# Patient Record
Sex: MALE | Race: WHITE | Employment: STUDENT | URBAN - METROPOLITAN AREA
[De-identification: names, ages, dates, MRNs, and addresses within clinical notes are randomized per-mention and may not be internally consistent; named-entity substitution may affect disease eponyms.]

---
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
## 2022-01-23 ENCOUNTER — APPOINTMENT (OUTPATIENT)
Dept: CT IMAGING | Age: 22
End: 2022-01-23
Payer: COMMERCIAL

## 2022-01-23 ENCOUNTER — HOSPITAL ENCOUNTER (EMERGENCY)
Age: 22
Discharge: HOME | End: 2022-01-23
Attending: EMERGENCY MEDICINE
Payer: COMMERCIAL

## 2022-01-23 VITALS
RESPIRATION RATE: 18 BRPM | DIASTOLIC BLOOD PRESSURE: 84 MMHG | OXYGEN SATURATION: 99 % | HEIGHT: 70 IN | WEIGHT: 145 LBS | TEMPERATURE: 99.3 F | SYSTOLIC BLOOD PRESSURE: 132 MMHG | HEART RATE: 91 BPM | BODY MASS INDEX: 20.76 KG/M2

## 2022-01-23 DIAGNOSIS — J36 TONSILLAR ABSCESS: Primary | ICD-10-CM

## 2022-01-23 DIAGNOSIS — J03.90 ACUTE TONSILLITIS, UNSPECIFIED ETIOLOGY: ICD-10-CM

## 2022-01-23 LAB
ANION GAP SERPL CALCULATED.3IONS-SCNC: 12 MMOL/L (ref 7–16)
BASOPHILS ABSOLUTE: 0.05 E9/L (ref 0–0.2)
BASOPHILS RELATIVE PERCENT: 0.3 % (ref 0–2)
BUN BLDV-MCNC: 12 MG/DL (ref 6–20)
CALCIUM SERPL-MCNC: 8.9 MG/DL (ref 8.6–10.2)
CHLORIDE BLD-SCNC: 100 MMOL/L (ref 98–107)
CO2: 24 MMOL/L (ref 22–29)
CREAT SERPL-MCNC: 1 MG/DL (ref 0.7–1.2)
EOSINOPHILS ABSOLUTE: 0.01 E9/L (ref 0.05–0.5)
EOSINOPHILS RELATIVE PERCENT: 0.1 % (ref 0–6)
GFR AFRICAN AMERICAN: >60
GFR NON-AFRICAN AMERICAN: >60 ML/MIN/1.73
GLUCOSE BLD-MCNC: 110 MG/DL (ref 74–99)
HCT VFR BLD CALC: 41.1 % (ref 37–54)
HEMOGLOBIN: 14.2 G/DL (ref 12.5–16.5)
IMMATURE GRANULOCYTES #: 0.06 E9/L
IMMATURE GRANULOCYTES %: 0.4 % (ref 0–5)
LYMPHOCYTES ABSOLUTE: 2.63 E9/L (ref 1.5–4)
LYMPHOCYTES RELATIVE PERCENT: 17.2 % (ref 20–42)
MCH RBC QN AUTO: 29.2 PG (ref 26–35)
MCHC RBC AUTO-ENTMCNC: 34.5 % (ref 32–34.5)
MCV RBC AUTO: 84.4 FL (ref 80–99.9)
MONO TEST: POSITIVE
MONOCYTES ABSOLUTE: 0.44 E9/L (ref 0.1–0.95)
MONOCYTES RELATIVE PERCENT: 2.9 % (ref 2–12)
NEUTROPHILS ABSOLUTE: 12.14 E9/L (ref 1.8–7.3)
NEUTROPHILS RELATIVE PERCENT: 79.1 % (ref 43–80)
PDW BLD-RTO: 11.9 FL (ref 11.5–15)
PLATELET # BLD: 202 E9/L (ref 130–450)
PMV BLD AUTO: 9.6 FL (ref 7–12)
POTASSIUM SERPL-SCNC: 4.6 MMOL/L (ref 3.5–5)
RBC # BLD: 4.87 E12/L (ref 3.8–5.8)
SODIUM BLD-SCNC: 136 MMOL/L (ref 132–146)
STREP GRP A PCR: NEGATIVE
WBC # BLD: 15.3 E9/L (ref 4.5–11.5)

## 2022-01-23 PROCEDURE — 70491 CT SOFT TISSUE NECK W/DYE: CPT

## 2022-01-23 PROCEDURE — 80048 BASIC METABOLIC PNL TOTAL CA: CPT

## 2022-01-23 PROCEDURE — 96365 THER/PROPH/DIAG IV INF INIT: CPT

## 2022-01-23 PROCEDURE — 2580000003 HC RX 258: Performed by: EMERGENCY MEDICINE

## 2022-01-23 PROCEDURE — 36415 COLL VENOUS BLD VENIPUNCTURE: CPT

## 2022-01-23 PROCEDURE — 96375 TX/PRO/DX INJ NEW DRUG ADDON: CPT

## 2022-01-23 PROCEDURE — 2580000003 HC RX 258: Performed by: STUDENT IN AN ORGANIZED HEALTH CARE EDUCATION/TRAINING PROGRAM

## 2022-01-23 PROCEDURE — 87880 STREP A ASSAY W/OPTIC: CPT

## 2022-01-23 PROCEDURE — 85025 COMPLETE CBC W/AUTO DIFF WBC: CPT

## 2022-01-23 PROCEDURE — 6360000004 HC RX CONTRAST MEDICATION: Performed by: STUDENT IN AN ORGANIZED HEALTH CARE EDUCATION/TRAINING PROGRAM

## 2022-01-23 PROCEDURE — 99282 EMERGENCY DEPT VISIT SF MDM: CPT

## 2022-01-23 PROCEDURE — 6360000002 HC RX W HCPCS: Performed by: EMERGENCY MEDICINE

## 2022-01-23 PROCEDURE — 86308 HETEROPHILE ANTIBODY SCREEN: CPT

## 2022-01-23 RX ORDER — 0.9 % SODIUM CHLORIDE 0.9 %
1000 INTRAVENOUS SOLUTION INTRAVENOUS ONCE
Status: COMPLETED | OUTPATIENT
Start: 2022-01-23 | End: 2022-01-23

## 2022-01-23 RX ORDER — METHYLPREDNISOLONE 4 MG/1
TABLET ORAL
Qty: 21 TABLET | Refills: 0 | Status: SHIPPED | OUTPATIENT
Start: 2022-01-23 | End: 2022-01-29

## 2022-01-23 RX ORDER — DEXAMETHASONE SODIUM PHOSPHATE 10 MG/ML
10 INJECTION INTRAMUSCULAR; INTRAVENOUS ONCE
Status: COMPLETED | OUTPATIENT
Start: 2022-01-23 | End: 2022-01-23

## 2022-01-23 RX ORDER — AMOXICILLIN AND CLAVULANATE POTASSIUM 875; 125 MG/1; MG/1
1 TABLET, FILM COATED ORAL 2 TIMES DAILY
Qty: 20 TABLET | Refills: 0 | Status: SHIPPED | OUTPATIENT
Start: 2022-01-23 | End: 2022-02-02

## 2022-01-23 RX ORDER — KETOROLAC TROMETHAMINE 30 MG/ML
15 INJECTION, SOLUTION INTRAMUSCULAR; INTRAVENOUS ONCE
Status: COMPLETED | OUTPATIENT
Start: 2022-01-23 | End: 2022-01-23

## 2022-01-23 RX ORDER — SODIUM CHLORIDE 0.9 % (FLUSH) 0.9 %
10 SYRINGE (ML) INJECTION PRN
Status: COMPLETED | OUTPATIENT
Start: 2022-01-23 | End: 2022-01-23

## 2022-01-23 RX ADMIN — KETOROLAC TROMETHAMINE 15 MG: 30 INJECTION, SOLUTION INTRAMUSCULAR at 20:50

## 2022-01-23 RX ADMIN — SODIUM CHLORIDE, PRESERVATIVE FREE 10 ML: 5 INJECTION INTRAVENOUS at 21:27

## 2022-01-23 RX ADMIN — IOPAMIDOL 75 ML: 755 INJECTION, SOLUTION INTRAVENOUS at 21:26

## 2022-01-23 RX ADMIN — DEXAMETHASONE SODIUM PHOSPHATE 10 MG: 10 INJECTION INTRAMUSCULAR; INTRAVENOUS at 20:50

## 2022-01-23 RX ADMIN — AMPICILLIN SODIUM AND SULBACTAM SODIUM 3000 MG: 2; 1 INJECTION, POWDER, FOR SOLUTION INTRAMUSCULAR; INTRAVENOUS at 21:32

## 2022-01-23 RX ADMIN — SODIUM CHLORIDE 1000 ML: 9 INJECTION, SOLUTION INTRAVENOUS at 20:49

## 2022-01-23 ASSESSMENT — PAIN SCALES - GENERAL
PAINLEVEL_OUTOF10: 8
PAINLEVEL_OUTOF10: 8

## 2022-01-23 ASSESSMENT — PAIN DESCRIPTION - LOCATION: LOCATION: THROAT

## 2022-01-24 NOTE — ED NOTES
Radiology Procedure Waiver   Name: Arash Soto  : 2000  MRN: [id number]    Date:  22    Time: 8:42 PM EST    Benefits of immediately proceeding with Radiology exam(s) without pre-testing outweigh the risks or are not indicated as specified below and therefore the following is/are being waived:    [] Pregnancy test   [] Patients LMP on-time and regular.   [] Patient had Tubal Ligation or has other Contraception Device. [] Patient  is Menopausal or Premenarcheal.    [] Patient had Full or Partial Hysterectomy. [] Protocol for Iodine allergy    [] MRI Questionnaire     [] BUN/Creatinine   [x] Patient age w/no hx of renal dysfunction. [] Patient on Dialysis. [] Recent Normal Labs.   Electronically signed by Dennsi Dupree DO on 22 at 8:42 PM EST             Dennis Dupree DO  22

## 2022-01-24 NOTE — ED PROVIDER NOTES
HPI:  1/23/22,   Time: 8:37 PM TARA Soto is a 25 y.o. male presenting to the ED for Sore throat trismus and low grade fever , beginning several weeks ago. The complaint has been persistent, moderate in severity, and worsened by nothing. Patient stated he been having a sore throat on and off for several weeks. He was initially treated with antibiotics for presumed strep throat. He had a recurrence in January 1 and was treated with Zithromax. States his symptoms improved until he developed COVID last week. He now presents with worsening sore throat. Feels like his tonsils are on fire. He does have difficulty swallowing. He also has some mild trismus. He did contact his PCP who wanted to call in Augmentin and dexamethasone but advised the patient come to the emergency department for evaluation. He has no chest pain shortness of breath no nausea vomiting. ROS:   Pertinent positives and negatives are stated within HPI, all other systems reviewed and are negative.  --------------------------------------------- PAST HISTORY ---------------------------------------------  Past Medical History:  has a past medical history of COVID-19. Past Surgical History:  has no past surgical history on file. Social History:  reports that he has never smoked. He has never used smokeless tobacco. He reports that he does not drink alcohol and does not use drugs. Family History: family history is not on file. The patients home medications have been reviewed. Allergies: Patient has no known allergies.     -------------------------------------------------- RESULTS -------------------------------------------------  All laboratory and radiology results have been personally reviewed by myself   LABS:  Results for orders placed or performed during the hospital encounter of 01/23/22   Strep Screen Group A Throat    Specimen: Throat   Result Value Ref Range    Strep Grp A PCR Negative Negative   CBC Auto Differential   Result Value Ref Range    WBC 15.3 (H) 4.5 - 11.5 E9/L    RBC 4.87 3.80 - 5.80 E12/L    Hemoglobin 14.2 12.5 - 16.5 g/dL    Hematocrit 41.1 37.0 - 54.0 %    MCV 84.4 80.0 - 99.9 fL    MCH 29.2 26.0 - 35.0 pg    MCHC 34.5 32.0 - 34.5 %    RDW 11.9 11.5 - 15.0 fL    Platelets 381 175 - 292 E9/L    MPV 9.6 7.0 - 12.0 fL    Neutrophils % 79.1 43.0 - 80.0 %    Immature Granulocytes % 0.4 0.0 - 5.0 %    Lymphocytes % 17.2 (L) 20.0 - 42.0 %    Monocytes % 2.9 2.0 - 12.0 %    Eosinophils % 0.1 0.0 - 6.0 %    Basophils % 0.3 0.0 - 2.0 %    Neutrophils Absolute 12.14 (H) 1.80 - 7.30 E9/L    Immature Granulocytes # 0.06 E9/L    Lymphocytes Absolute 2.63 1.50 - 4.00 E9/L    Monocytes Absolute 0.44 0.10 - 0.95 E9/L    Eosinophils Absolute 0.01 (L) 0.05 - 0.50 E9/L    Basophils Absolute 0.05 0.00 - 0.20 E9/L       RADIOLOGY:  Interpreted by Radiologist.  CT SOFT TISSUE NECK W CONTRAST   Final Result   Addendum 1 of 1   ADDENDUM:   Discussed with Dr. Dennis Dupree at 9:54 p.m. Final   Tonsillitis and probable intra tonsillar abscess on the left and possibly the   right. RECOMMENDATIONS:   The findings were sent to the Radiology Results [address] at 9:47   pm on 1/23/2022to be communicated to a licensed caregiver.                ------------------------- NURSING NOTES AND VITALS REVIEWED ---------------------------   The nursing notes within the ED encounter and vital signs as below have been reviewed.    BP (!) 157/80   Pulse (!) 114   Temp 99.3 °F (37.4 °C)   Resp 14   Ht 5' 10\" (1.778 m)   Wt 145 lb (65.8 kg)   SpO2 100%   BMI 20.81 kg/m²   Oxygen Saturation Interpretation: Normal      ---------------------------------------------------PHYSICAL EXAM--------------------------------------      Constitutional/General: Alert and oriented x3, well appearing, non toxic in NAD, thin in appearance  Head: NC/AT  Eyes: PERRL, EOMI  Mouth: Oropharynx clear, handling secretions, patient has a 2+ tonsils with mild erythema. There is mild exudate. He does have very mild trismus. He has no pain to palpation over the larynx. Neck: Supple, full ROM, no meningeal signs  Pulmonary: Lungs clear to auscultation bilaterally, no wheezes, rales, or rhonchi. Not in respiratory distress  Cardiovascular:  Regular rate and rhythm, no murmurs, gallops, or rubs. 2+ distal pulses  Abdomen: Soft, non tender, non distended,   Extremities: Moves all extremities x 4. Warm and well perfused  Skin: warm and dry without rash  Neurologic: GCS 15, no focal deficits. Psych: Normal Affect      ------------------------------ ED COURSE/MEDICAL DECISION MAKING----------------------  Medications   0.9 % sodium chloride bolus (0 mLs IntraVENous Stopped 1/23/22 2104)   ampicillin-sulbactam (UNASYN) 3000 mg ivpb minibag (0 mg IntraVENous Stopped 1/23/22 2202)   ketorolac (TORADOL) injection 15 mg (15 mg IntraVENous Given 1/23/22 2050)   dexamethasone (DECADRON) injection 10 mg (10 mg IntraVENous Given 1/23/22 2050)   iopamidol (ISOVUE-370) 76 % injection 75 mL (75 mLs IntraVENous Given 1/23/22 2126)   sodium chloride flush 0.9 % injection 10 mL (10 mLs IntraVENous Given 1/23/22 2127)         Medical Decision Making:    Patient is given IV fluids Unasyn dexamethasone Toradol. He was sent for CT soft tissue neck with IV contrast.    Discussed CT findings with the ENT resident. He reviewed images. He agrees with Unasyn and dexamethasone. Recommend the patient stay on Augmentin for 10 days. Recommend follow-up in the next several days with Dr. Fidel Case in office. Counseling: The emergency provider has spoken with the patient and family member patient and mother and discussed todays results, in addition to providing specific details for the plan of care and counseling regarding the diagnosis and prognosis.   Questions are answered at this time and they are agreeable with the plan.      --------------------------------- IMPRESSION AND DISPOSITION ---------------------------------    IMPRESSION  1. Tonsillar abscess    2.  Acute tonsillitis, unspecified etiology        DISPOSITION  Disposition: Discharge to home  Patient condition is stable                Choco Marin DO  01/23/22 5216

## 2022-01-25 ENCOUNTER — TELEPHONE (OUTPATIENT)
Dept: ADMINISTRATIVE | Age: 22
End: 2022-01-25

## 2022-01-25 NOTE — TELEPHONE ENCOUNTER
Mom called and pt has an ED follow up with Dr Tabitha Main on Friday and she wanted to see if he can be seen any sooner? She said that he is feeling much worse than when he left the ED.

## 2022-01-26 NOTE — TELEPHONE ENCOUNTER
Spoke with patients mom and explained that we did not have any appointments until Friday due to Doctor is in surgery Wednesday and Thursday all day

## 2022-01-28 ENCOUNTER — OFFICE VISIT (OUTPATIENT)
Dept: ENT CLINIC | Age: 22
End: 2022-01-28
Payer: COMMERCIAL

## 2022-01-28 VITALS
HEIGHT: 70 IN | HEART RATE: 93 BPM | SYSTOLIC BLOOD PRESSURE: 133 MMHG | BODY MASS INDEX: 21.3 KG/M2 | WEIGHT: 148.8 LBS | DIASTOLIC BLOOD PRESSURE: 73 MMHG

## 2022-01-28 DIAGNOSIS — B27.90 ACUTE PHARYNGITIS DUE TO INFECTIOUS MONONUCLEOSIS: Primary | ICD-10-CM

## 2022-01-28 PROCEDURE — G8420 CALC BMI NORM PARAMETERS: HCPCS | Performed by: OTOLARYNGOLOGY

## 2022-01-28 PROCEDURE — 99204 OFFICE O/P NEW MOD 45 MIN: CPT | Performed by: OTOLARYNGOLOGY

## 2022-01-28 PROCEDURE — G8427 DOCREV CUR MEDS BY ELIG CLIN: HCPCS | Performed by: OTOLARYNGOLOGY

## 2022-01-28 PROCEDURE — G8484 FLU IMMUNIZE NO ADMIN: HCPCS | Performed by: OTOLARYNGOLOGY

## 2022-01-28 PROCEDURE — 1036F TOBACCO NON-USER: CPT | Performed by: OTOLARYNGOLOGY

## 2022-01-28 RX ORDER — M-VIT,TX,IRON,MINS/CALC/FOLIC 27MG-0.4MG
1 TABLET ORAL DAILY
COMMUNITY

## 2022-01-28 RX ORDER — ETODOLAC 400 MG/1
1 TABLET, FILM COATED ORAL 2 TIMES DAILY
COMMUNITY

## 2022-01-28 NOTE — PROGRESS NOTES
[id number] [facility] Otolaryngology  Dr. Ed Valenzuela. CONSTANTINO Quiroga Ms.Ed. New Consult       Patient Name:  Balbir Osuna  :  2000     CHIEF C/O:    Chief Complaint   Patient presents with    Pharyngitis     ed f/u left more than right - mno drooling       HISTORY OBTAINED FROM:  patient    HISTORY OF PRESENT ILLNESS:       Westley Rivas is a 25y.o. year old male, here today for persistent and recurrent pharyngitis and sore throat, evaluated for possible tonsillar abscess. Patient states he had intermittent episodes of sore throat, with 3 separate antibiotic therapies for which she received and then began to have increasing levels of sore throat most recently been seen in emergency department, underwent a CT scan that reported \"intratonsillar abscess\". No other complaints of unilateral pain or swelling, no complaints of external neck swelling, no prior history of chronic pharyngitis or acute pharyngitis. No current complaints of changes in voice no epistaxis congestion shortness of breath fever chills this time. Patient is currently finishing antibiotic and steroid, and feeling significantly better overall, he additionally did test positive for mono at the time. Past Medical History:   Diagnosis Date    COVID-19      No past surgical history on file. Current Outpatient Medications:     etodolac (LODINE) 400 MG tablet, Take 1 tablet by mouth 2 times daily, Disp: , Rfl:     Multiple Vitamins-Minerals (THERAPEUTIC MULTIVITAMIN-MINERALS) tablet, Take 1 tablet by mouth daily, Disp: , Rfl:   Patient has no known allergies. Social History     Tobacco Use    Smoking status: Never Smoker    Smokeless tobacco: Never Used   Substance Use Topics    Alcohol use: Never    Drug use: Never     No family history on file. Review of Systems   Constitutional: Negative for chills and fever. HENT: Negative for congestion, ear discharge, hearing loss, nosebleeds, rhinorrhea, sneezing, tinnitus and trouble swallowing. Respiratory: Negative for cough and shortness of breath. Cardiovascular: Negative for chest pain and palpitations. Gastrointestinal: Negative for vomiting. Skin: Negative for rash. Allergic/Immunologic: Negative for environmental allergies. Neurological: Negative for dizziness and headaches. Hematological: Does not bruise/bleed easily. All other systems reviewed and are negative. /73 (Site: Right Upper Arm, Position: Sitting, Cuff Size: Large Adult)   Pulse 93   Ht 5' 10\" (1.778 m)   Wt 148 lb 12.8 oz (67.5 kg)   BMI 21.35 kg/m²   Physical Exam  Vitals and nursing note reviewed. Constitutional:       Appearance: He is well-developed. HENT:      Head: Normocephalic and atraumatic. Right Ear: Tympanic membrane and ear canal normal.      Left Ear: Tympanic membrane and ear canal normal.      Nose: Congestion present. No rhinorrhea. Mouth/Throat:      Lips: No lesions. Pharynx: No pharyngeal swelling, oropharyngeal exudate, posterior oropharyngeal erythema or uvula swelling. Tonsils: No tonsillar exudate or tonsillar abscesses. 3+ on the right. 3+ on the left. Eyes:      Pupils: Pupils are equal, round, and reactive to light. Neck:      Thyroid: No thyromegaly. Trachea: No tracheal deviation. Cardiovascular:      Rate and Rhythm: Normal rate. Pulmonary:      Effort: Pulmonary effort is normal. No respiratory distress. Musculoskeletal:         General: Normal range of motion. Cervical back: Normal range of motion. Lymphadenopathy:      Cervical: No cervical adenopathy. Skin:     General: Skin is warm. Findings: No erythema. Neurological:      Mental Status: He is alert. Cranial Nerves: No cranial nerve deficit.          IMPRESSION/PLAN:  Patient is seen and examined, with a negative exam for peritonsillar abscess CT scans reviewed, there are no evidence of any abscess or microabscesses in the tonsillar fossa and/or beds or a radiographic finding not a clinical concern. Patient's definitive medical therapy is indicated follow-up in 6 weeks if you continue to have persistent disease consider possible elective tonsillectomy. Dr. Salena French Otolaryngology/Facial Plastic Surgery Residency  Associate Clinical Professor:  Андрей Menendez [facility] Tissue Cultured Epidermal Autograft Text: The defect edges were debeveled with a #15 scalpel blade.  Given the location of the defect, shape of the defect and the proximity to free margins a tissue cultured epidermal autograft was deemed most appropriate.  The graft was then trimmed to fit the size of the defect.  The graft was then placed in the primary defect and oriented appropriately.

## 2022-02-03 ASSESSMENT — ENCOUNTER SYMPTOMS
TROUBLE SWALLOWING: 0
VOMITING: 0
SHORTNESS OF BREATH: 0
RHINORRHEA: 0
COUGH: 0

## 2022-05-16 ENCOUNTER — OFFICE VISIT (OUTPATIENT)
Dept: ENT CLINIC | Age: 22
End: 2022-05-16
Payer: COMMERCIAL

## 2022-05-16 VITALS
BODY MASS INDEX: 20.76 KG/M2 | WEIGHT: 145 LBS | HEIGHT: 70 IN | DIASTOLIC BLOOD PRESSURE: 70 MMHG | SYSTOLIC BLOOD PRESSURE: 125 MMHG | HEART RATE: 86 BPM

## 2022-05-16 DIAGNOSIS — B27.90 ACUTE PHARYNGITIS DUE TO INFECTIOUS MONONUCLEOSIS: Primary | ICD-10-CM

## 2022-05-16 PROCEDURE — G8427 DOCREV CUR MEDS BY ELIG CLIN: HCPCS | Performed by: OTOLARYNGOLOGY

## 2022-05-16 PROCEDURE — 1036F TOBACCO NON-USER: CPT | Performed by: OTOLARYNGOLOGY

## 2022-05-16 PROCEDURE — 99213 OFFICE O/P EST LOW 20 MIN: CPT | Performed by: OTOLARYNGOLOGY

## 2022-05-16 PROCEDURE — G8420 CALC BMI NORM PARAMETERS: HCPCS | Performed by: OTOLARYNGOLOGY

## 2022-05-16 ASSESSMENT — ENCOUNTER SYMPTOMS
SORE THROAT: 1
SHORTNESS OF BREATH: 0
VOICE CHANGE: 0
RHINORRHEA: 0
VOMITING: 0
TROUBLE SWALLOWING: 0
COUGH: 0

## 2022-05-16 NOTE — PROGRESS NOTES
[facility] Otolaryngology  Dr. Sadie Manning. Ms.Ed        Patient Name:  Dilma Santos  :  2000     CHIEF C/O:    Chief Complaint   Patient presents with    Follow-up     fu from tonsil abcess, sick 1 wk ago with tonsil flare,        HISTORY OBTAINED FROM:  patient    HISTORY OF PRESENT ILLNESS:       Esther De Jesus is a 25y.o. year old male, here today for follow up of hx tonsil abscess. Was sick about a week ago and had a flare up of sore throat and enlarged tonsils. Is starting to get better but sore when swallows      Past Medical History:   Diagnosis Date    COVID-19      History reviewed. No pertinent surgical history. Current Outpatient Medications:     etodolac (LODINE) 400 MG tablet, Take 1 tablet by mouth 2 times daily, Disp: , Rfl:     Multiple Vitamins-Minerals (THERAPEUTIC MULTIVITAMIN-MINERALS) tablet, Take 1 tablet by mouth daily, Disp: , Rfl:   Patient has no known allergies. Social History     Tobacco Use    Smoking status: Never Smoker    Smokeless tobacco: Never Used   Substance Use Topics    Alcohol use: Never    Drug use: Never     History reviewed. No pertinent family history. Review of Systems   Constitutional: Negative for chills and fever. HENT: Positive for sore throat. Negative for congestion, ear discharge, hearing loss, postnasal drip, rhinorrhea, trouble swallowing and voice change. Respiratory: Negative for cough and shortness of breath. Cardiovascular: Negative for chest pain and palpitations. Gastrointestinal: Negative for vomiting. Skin: Negative for rash. Allergic/Immunologic: Negative for environmental allergies. Neurological: Negative for dizziness and headaches. Hematological: Does not bruise/bleed easily. All other systems reviewed and are negative.       /70 (Site: Left Upper Arm, Position: Sitting, Cuff Size: Medium Adult)   Pulse 86   Ht 5' 10\" (1.778 m)   Wt 145 lb (65.8 kg)   BMI 20.81 kg/m²   Physical Exam  Vitals and nursing note reviewed. Constitutional:       Appearance: He is well-developed. HENT:      Head: Normocephalic and atraumatic. No contusion, masses or laceration. Jaw: No tenderness or swelling. Right Ear: Tympanic membrane, ear canal and external ear normal. No decreased hearing noted. There is no impacted cerumen. Left Ear: Tympanic membrane, ear canal and external ear normal. No decreased hearing noted. There is no impacted cerumen. Nose: Nose normal. No septal deviation, congestion or rhinorrhea. Right Nostril: No epistaxis. Left Nostril: No epistaxis. Right Turbinates: Not enlarged or swollen. Left Turbinates: Not enlarged or swollen. Mouth/Throat:      Mouth: Mucous membranes are moist. No oral lesions. Dentition: No gum lesions. Pharynx: No oropharyngeal exudate or posterior oropharyngeal erythema. Tonsils: 3+ on the right. 3+ on the left. Comments: Cryptic tonsils  Eyes:      Pupils: Pupils are equal, round, and reactive to light. Neck:      Thyroid: No thyromegaly. Trachea: No tracheal deviation. Cardiovascular:      Rate and Rhythm: Normal rate. Pulmonary:      Effort: Pulmonary effort is normal. No respiratory distress. Musculoskeletal:         General: Normal range of motion. Cervical back: Normal range of motion. Lymphadenopathy:      Cervical: No cervical adenopathy. Skin:     General: Skin is warm. Findings: No erythema. Neurological:      Mental Status: He is alert. Cranial Nerves: No cranial nerve deficit. IMPRESSION/PLAN:  Discussed r/ba for tonsillectomy. Will discuss and call office if wants to proceed. Risks discussed including bleeding infection need future surgeries. Dr. Emeline Gibson D. Bunevich D.O. Ms. Romayne High.  Otolaryngology Facial Plastic Surgery  :  [address] [place] [address] Otolaryngology/Facial Plastic Surgery Residency  Associate Clinical Professor:  Gilberto Silva [employer]

## 2022-05-16 NOTE — PATIENT INSTRUCTIONS
SURGERY:_____/_____/_____    Nothing to eat or drink after midnight the night before surgery unless surgery is at [facility] or otherwise instructed by the hospital.    DO NOT TAKE ANY ASPIRIN PRODUCTS 7 days prior to surgery. Tylenol only. No Advil, Motrin, Aleve, or Ibuprofen. IF YOU ARE ON BLOOD THINNERS (PLAVIX, COUMADIN, ELIQUIS ETC) THESE WILL ALSO NEED TO BE HELD. Any illegal drugs in your system (including Marijuana even if legally prescribed) will result in your surgery being cancelled. Please be sure to check with our office or the hospital on time frame for the drugs to be out of your system. SHOULD YOUR INSURANCE CHANGE AT ANY TIME YOU MUST CONTACT OUR OFFICE. FAILURE TO DO SO MAY RESULT IN YOUR SURGERY BEING RESCHEDULED OR YOU MAY BE CHARGED AS SELF-PAY. Due to the high demand for surgery at our practice, if you cancel or reschedule your surgery two (2) times we may not reschedule you. If you need FMLA or Short Term Disability paperwork completed for your surgery, please complete your portion, ensure your name and date of birth are on them and fax them to [phone] asap. Paperwork can take up to 2 weeks to be completed. Per current [facility] protocols, COVID Testing is NOT required prior to procedure UNLESS you are symptomatic. (Vaccinated or Unvaccinated)- [facility] requires COVID Testing 72 hours prior to surgery. If you need medical clearance, you are responsible to contact your physician(s) to schedule the appointment. If clearance is not completed within 30 days of your surgery it may be cancelled. Our office will fax the appropriate forms that need to be completed to your physician(s).     The location of your surgery will call you the day prior to your surgery date to let you know what time you have to be there and any other details. (they usually don't call until late afternoon- early evening.)- IF YOU HAVE QUESTIONS REGARDING THE TIME OF YOUR SURGERY, PLEASE CALL THE FACILITY YOU ARE SCHEDULED AT.     ·       [address] , [address] [facility], [facility] will call you a couple days prior to surgery and give you further instructions, if you have any questions, you can reach them at [phone]    ·       [address] [facility] will call you a couple days prior to surgery and give you further instructions, if you have any questions, you can reach them at [phone]    ·       [facility], [address]  [place] [facility] will call you a couple days prior to surgery and give you further instructions, if you have any questions, you can reach them at [phone]